# Patient Record
Sex: FEMALE | Race: BLACK OR AFRICAN AMERICAN | NOT HISPANIC OR LATINO | Employment: FULL TIME | URBAN - METROPOLITAN AREA
[De-identification: names, ages, dates, MRNs, and addresses within clinical notes are randomized per-mention and may not be internally consistent; named-entity substitution may affect disease eponyms.]

---

## 2023-01-09 ENCOUNTER — MEDICAL CORRESPONDENCE (OUTPATIENT)
Dept: HEALTH INFORMATION MANAGEMENT | Facility: CLINIC | Age: 28
End: 2023-01-09

## 2023-01-14 ENCOUNTER — TRANSCRIBE ORDERS (OUTPATIENT)
Dept: OTHER | Age: 28
End: 2023-01-14

## 2023-01-14 DIAGNOSIS — Z83.49 FAMILY HISTORY OF AMYLOIDOSIS: Primary | ICD-10-CM

## 2023-01-17 ENCOUNTER — TELEPHONE (OUTPATIENT)
Dept: CONSULT | Facility: CLINIC | Age: 28
End: 2023-01-17
Payer: COMMERCIAL

## 2023-01-17 NOTE — TELEPHONE ENCOUNTER
Called Griselda to review referral to genetics and gather required records. She was unavailable and a non-detailed VM with contact information was left.    Rhonda Apodaca MS [facility]  Genetic Counselor  Email: [email or web address]  Phone: [phone]  Pager: [phone]

## 2023-01-23 ENCOUNTER — TELEPHONE (OUTPATIENT)
Dept: CONSULT | Facility: CLINIC | Age: 28
End: 2023-01-23
Payer: COMMERCIAL

## 2023-01-23 NOTE — TELEPHONE ENCOUNTER
Called Griselda to follow-up regarding recent referral to genetics. She reports that her father was diagnosed a little less than a year ago at the [facility]. She will email me his records.    Rhonda Apodaca MS [facility]  Genetic Counselor  Email: [email or web address]  Phone: [phone]  Pager: [phone]

## 2023-02-28 ENCOUNTER — TELEPHONE (OUTPATIENT)
Dept: CONSULT | Facility: CLINIC | Age: 28
End: 2023-02-28
Payer: COMMERCIAL

## 2023-02-28 NOTE — TELEPHONE ENCOUNTER
LVM for patient to call me back directly to schedule GC only visit with Rhonda/Lisa/Adrienne in Neuro slot.

## 2023-04-17 ENCOUNTER — VIRTUAL VISIT (OUTPATIENT)
Dept: CONSULT | Facility: CLINIC | Age: 28
End: 2023-04-17
Attending: NURSE PRACTITIONER
Payer: COMMERCIAL

## 2023-04-17 VITALS — WEIGHT: 156 LBS | BODY MASS INDEX: 23.64 KG/M2 | HEIGHT: 68 IN

## 2023-04-17 DIAGNOSIS — Z83.49 FAMILY HISTORY OF AMYLOIDOSIS: ICD-10-CM

## 2023-04-17 DIAGNOSIS — Z71.83 ENCOUNTER FOR NONPROCREATIVE GENETIC COUNSELING AND TESTING: Primary | ICD-10-CM

## 2023-04-17 DIAGNOSIS — Z13.71 ENCOUNTER FOR NONPROCREATIVE GENETIC COUNSELING AND TESTING: Primary | ICD-10-CM

## 2023-04-17 PROCEDURE — 96040 HC GENETIC COUNSELING, EACH 30 MINUTES: CPT | Mod: GT,95 | Performed by: GENETIC COUNSELOR, MS

## 2023-04-17 ASSESSMENT — PAIN SCALES - GENERAL: PAINLEVEL: NO PAIN (0)

## 2023-04-17 NOTE — PROGRESS NOTES
Virtual Visit Details    Type of service:  Video Visit     Originating Location (pt. Location): Home    Distant Location (provider location):  On-site  Platform used for Video Visit: Quentin      Name:  Griselda Thao  :   1995  MRN:   [id number]  Date of service: 2023  Referring Provider: Syeda Ortiz    Genetic Counseling Consultation Note    Presenting Information:  A consultation in the [facility] was requested for Griselda, a 27 year old female, for evaluation of family history of hereditary amyloidosis. This consultation was requested by Syeda Ortiz NP.    Griselda attended this visit conducted by video alone.    History is obtained from Griselda and the medical record. I met with the family to obtain a personal and family history, discuss possible genetic contributions to her symptoms, and to obtain informed consent for genetic testing.    Personal History:   Griselda does not have a history of amyloidosis or other major health concerns.    Previous Genetic Testing  Griselda has never had genetic testing.    Griselda's father has had genetic testing and his results are as follows:      Family History:   A standard three generation pedigree was obtained and is scanned into the medical record.  History pertinent to referral is underlined.    Children: NA    Siblings:     Full siblings: 31 y/o brother, healthy    Paternal:     FatherChristian: 62 y/o, history of hereditary amyloidosis (genetic test results above). Griselda does not have a great detail of information about his symptoms and presentation, but believe them to be more cardiac in nature    Paternal grandfather: , cause and age unknown    Paternal grandmother: , heart-related and age unknown    Paternal aunts/uncles:     Uncle  d/t MI at 66 y/o     aunt, cause and age unknown    Aunt in 70s with history of stroke    Uncle living, no health concerns    Paternal cousins: Numerous,  no health concerns known    Maternal:     MotherChata: 62 y/o, healthy    Maternal grandfather: , cause and age unknown    Maternal grandmother: , cause and age unknown    Maternal aunts/uncles:     Uncle,     Maternal cousins: Numerous, no health concerns known    There are no additional reports of family members with hereditary amyloidosis or history of genetic testing. Paternal ancestry is of [place]  descent.  Maternal ancestry is of [place] citizen, [place], and [place] descent. Consanguinity is denied.     Genetic Counseling Discussion:  Hereditary ATTR amyloidosis is characterized by amyloid deposits (abnormal, misfolded proteins) in the nervous system, heart, and possibly other body parts. Symptoms associated with hereditary ATTR amyloidosis can include progressive neuropathy (nerve damage), cardiomyopathy (dysfunction of the heart muscle), cardiac conduction disease (dysfunction of the heart's electrical system which controls its rate and rhythm), vitreous opacification (when the gel within the eye transitions from clear to cloudy), nephropathy (kidney disease), among many others.      Most people with hereditary ATTR amyloidosis will present with neuropathy or cardiomyopathy. The age of onset of hereditary amyloidosis is often in mid to late adulthood. Not everyone with hereditary ATTR amyloidosis will develop symptoms of the condition in their lifetime. At this time, there is no way to predict who will develop symptoms of this condition and at what age these symptoms will present.     For more information on hereditary ATTR amyloidosis, please refer to MedlinePlus: https://medlineplus.gov/genetics/condition/transthyretin-amyloidosis/     To review, our bodies are made up of millions of cells. Within each of those cells are structures called chromosomes. Our chromosomes contain our genes. Genes can be thought of as the instruction manual for our bodies. They are made up of long strings  of base pairs which are abbreviated as A s, T s, C s, and G s. We have 2 copies of nearly every gene; we inherit 1 copy from our mother and 1 copy from our father. Sometimes a change or variant can occur in the letters which make up the gene that interrupts the instruction of the gene. Depending on the gene and the variant, this can be associated with different health problems. Griselda's father's genetic testing found at position 424 in the TTR gene, a base pair A where we would typically expect to find a G. Due to historical naming, this variant is sometimes known as [id number].    This TTR variant is more strongly associated with cardiac symptoms including an enlarged heart (cardiomegaly), conduction block, problems with the rate or rhythm of your heartbeat (arrhythmia), chest pain, congestive heart failure, and sudden death. The median age of diagnosis is 71 y/o. The penetrance is unknown, with estimates as low as 10% overall and as high as 80% for men aged >70 years. Approximately 3.0%-3.9% of  Americans are heterozygous for this variant, like Griselda's father.    Hereditary ATTR amyloidosis is a dominant genetic condition. This means that for a person diagnosed with hereditary ATTR amyloidosis, there is a 1/2 or 50% chance with each pregnancy to pass the condition down. Based on family history, there is a 1/2 or 50% chance of a positive result for Grisleda.       We discussed the details, limitations, and possible outcomes of familial variant testing. There are two types of results:    Negative: meaning the familial variant (or other pathogenic variants) were not identified  o A negative result does not rule out the possibility of all genetic conditions for Griselda  o There is a 1/2 or 50% chance of this outcome based on family history    Positive: meaning the familial variant (or other pathogenic variants) were identified  o We discussed that a positive result could provide an etiology to Jewell  symptoms, give anticipatory guidance as to their potential progression, and clarify risks to family members.  We also discussed that a positive result could indicate that Griselda is at risks for other health concerns, outside of their presenting symptoms  o There is a 1/2 or 50% chance of this outcome based on family history    We discussed the potential benefits of genetic testing and why this genetic testing is medically indicated. A positive result will help clarify family noted in Griselda and would guide medical management for Griselda. There are now several treatment options available for hereditary ATTR amyloidosis. Treatment options include orthotopic liver transplantation, TTR tetramer stabilizers (tafamidis and diflunisal), and gene-silencing therapies (patisiran and inotersen). There are several new therapies under investigation. If a genetic cause is found for Griselda, it will give us a more accurate risk assessment for other family members.  Thus, the recommended testing for Griselda  is DIAGNOSTIC testing, and it is NOT investigational.     Resources    Https://www.oneamyloidosisvoice.com/    Https://amyloidosis.org/facts/familial/    Https://www.amyloidosissupport.org/    https://medlineplus.gov/genetics/condition/transthyretin-amyloidosis/    Genetic Information Nondiscrimination Act (HALLIE)  We discussed the Genetic Information Nondiscrimination Act (HALLIE) of 2008.  HALLIE prohibits discrimination in health coverage because of genetic information. Genetic information refers to an individual's genetic tests and family medical history (including family member's genetic tests). So, health insurers may not use genetic information to determine if someone is eligible for insurance or to make coverage, underwriting or premium-setting decisions. Therefore, it is illegal for a patient's health insurer to use family health history and genetic test results as a reason to deny health insurance or decide costs of  health insurance.    HALLIE also prohibits discrimination in employment (employees and applicants) because of genetic information. Genetic information refers to an individual's genetic tests and family medical history (including family member's genetic tests). This means that HALLIE prevents employers from using genetic information in employment decisions such as hiring, firing, promotions, pay, and job assignments. However, the [place]  and some other professions are permitted to use genetic and medical information to make employment decisions.    There are other exceptions to AHLLIE..HALLIE's protection applies to employers of 15 or more employees.  HALLIE does not apply to life insurance, long term care insurance, and disability insurance, though some states have state laws that offer additional protections against genetic discrimination in these lines of insurance.     For more information refer to: https://www.genome.gov/about-genomics/policy-issues/Genetic-Discrimination    Plan:  1. Griselda expressed verbal informed consent to proceed with genetic testing (TTR hereditary amyloidosis) via their insurance benefits. I will mail a buccal collection kit to their home address. Griselda will follow the included instructions and mail back to the laboratory.     The laboratory will conduct a benefits investigation for genetic testing and will send Griselda the estimated out of pocket cost via text message (typically within 48 hours). Testing will proceed automatically via insurance billing unless Griselda reaches out to the laboratory directly or myself in a timely manner to cancel testing or change billing information. Self-payment, financial assistance, and payment plans may be available. Griselda is aware that this is only an estimation of benefits.    2. The results of genetic testing are available ~3 weeks after the sample is received by the laboratory.  I will call Griselda with the results when they are available.  "Results will not be left via voicemail, regardless of outcome.  3. Contact information provided.    Rhonda Apodaca, MS [facility]  Genetic Counselor  Email: [email or web address]  Phone: [phone]  Pager: [phone]    Total Time Spent in Consultation: Approximately 30 minutes    CC: No Letter    References:  Sherita Jones, and Aime Landaverde. \"Counseling Family Members and Monitoring for Evidence of Disease in Asymptomatic Carriers of Amyloid Transthyretin Cardiac Amyloidosis.\" The American Journal of Cardiology 185 (2022): S43-S50.    Bernardo Valentine, and Mariano Ortiz. \"Can We Manage Presymptomatic TTR V142I Related Risk?.\" Heart Failure 10.2 (2022): 139-141.    Nina Zhao, et al. \"Recommendations for presymptomatic genetic testing and management of individuals at risk for hereditary transthyretin amyloidosis.\" Current opinion in neurology 29.Suppl 1 (2016): S27.     Kristi PEÑA. Hereditary Transthyretin Amyloidosis. 2001 Nov 5 [Updated 2021 Jun 17]. In: Jero MP, Vlad HH, Victor Manuel RA, et al., editors. [employer]  [Internet]. [place] ([place]): [employer], [place]; 0410-0098.    "

## 2023-05-10 ENCOUNTER — TELEPHONE (OUTPATIENT)
Dept: CONSULT | Facility: CLINIC | Age: 28
End: 2023-05-10
Payer: COMMERCIAL